# Patient Record
(demographics unavailable — no encounter records)

---

## 2025-05-22 NOTE — CONSULT LETTER
[Dear  ___] : Dear  [unfilled], [Consult Letter:] : I had the pleasure of evaluating your patient, [unfilled]. [Please see my note below.] : Please see my note below. [Consult Closing:] : Thank you very much for allowing me to participate in the care of this patient.  If you have any questions, please do not hesitate to contact me. [Sincerely,] : Sincerely, [FreeTextEntry3] : Chema Bolden MD [facility] Associated Professor, [employer]

## 2025-05-22 NOTE — ASSESSMENT
[FreeTextEntry1] : Patient 62-year-old female referred here from [facility] bilateral myringotomies patient is convinced she has fluid in her ears when she goes upside down her ear fluid clears up she does not want any tubes she just wants myringotomies we did a hearing test and we had a long discussion.  Audiogram was performed did indeed confirm bilateral fluid patient that has underlying lupus we did go ahead and do a myringotomy on both sides using formaldehyde to numb the eardrum myringotomies were performed the fluid was suctioned out she was given Ciprodex drops post procedure and will follow-up in 2 weeks we will get a audiogram to confirm improvement in her hearing.

## 2025-05-22 NOTE — END OF VISIT
[FreeTextEntry3] : I, Dr. Bolden personally performed the evaluation and management (E/M) services including all necessary procedures, for this new patient. That E/M includes conducting the clinically appropriate initial history &/or exam, assessing all conditions, and establishing the plan of care. Today, my SHAGGY, Priyanka Corrales, was here to observe &/or participate in the visit & follow plan of care established by me.

## 2025-05-22 NOTE — HISTORY OF PRESENT ILLNESS
[de-identified] : Patient has had fluid in both ears for at least a year. 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 Given oral steroids again more recently but both times they didnt help.  She does have a ton of mucus for the last year or so as well. She is coughing all the time, blowing her nose all the time.  She was never tested for seasonal allergies

## 2025-05-22 NOTE — REVIEW OF SYSTEMS
[Hearing Loss] : hearing loss [As Noted in HPI] : as noted in HPI [Nasal Congestion] : nasal congestion [Negative] : Heme/Lymph

## 2025-05-22 NOTE — PHYSICAL EXAM
[Nasal Endoscopy Performed] : nasal endoscopy was performed, see procedure section for findings [Midline] : trachea located in midline position [Normal] : no rashes [de-identified] : fluid behind the bilateral TMs